# Patient Record
Sex: MALE | Race: WHITE | NOT HISPANIC OR LATINO | Employment: FULL TIME | URBAN - METROPOLITAN AREA
[De-identification: names, ages, dates, MRNs, and addresses within clinical notes are randomized per-mention and may not be internally consistent; named-entity substitution may affect disease eponyms.]

---

## 2021-12-08 ENCOUNTER — APPOINTMENT (OUTPATIENT)
Dept: GENERAL RADIOLOGY | Facility: HOSPITAL | Age: 21
End: 2021-12-08

## 2021-12-08 ENCOUNTER — HOSPITAL ENCOUNTER (EMERGENCY)
Facility: HOSPITAL | Age: 21
Discharge: HOME | End: 2021-12-08
Attending: EMERGENCY MEDICINE | Admitting: EMERGENCY MEDICINE

## 2021-12-08 VITALS
TEMPERATURE: 98.3 F | BODY MASS INDEX: 20 KG/M2 | WEIGHT: 142.86 LBS | OXYGEN SATURATION: 98 % | RESPIRATION RATE: 17 BRPM | HEART RATE: 70 BPM | SYSTOLIC BLOOD PRESSURE: 97 MMHG | DIASTOLIC BLOOD PRESSURE: 69 MMHG | HEIGHT: 71 IN

## 2021-12-08 DIAGNOSIS — S60.221A CONTUSION OF RIGHT HAND, INITIAL ENCOUNTER: Primary | ICD-10-CM

## 2021-12-08 PROCEDURE — 73130 X-RAY EXAM OF HAND: CPT

## 2021-12-08 PROCEDURE — 99282 EMERGENCY DEPT VISIT SF MDM: CPT

## 2021-12-08 RX ORDER — NAPROXEN 375 MG/1
375 TABLET ORAL 2 TIMES DAILY PRN
Qty: 14 TABLET | Refills: 0 | Status: SHIPPED | OUTPATIENT
Start: 2021-12-08

## 2021-12-09 NOTE — ED PROVIDER NOTES
Subjective   Patient is a 21-year-old male who states he injured his right hand while he was working on a car.  He complains of pain at that site.  Has no other complaint of injury          Review of Systems  For numbness tingling or other complaint of injury  No past medical history on file.    Allergies   Allergen Reactions   • [place] Hives       No past surgical history on file.    No family history on file.    Social History     Socioeconomic History   • Marital status: Single           Objective   Physical Exam  Extremities M shows pain on palpation of the dorsum of the patient's right hand.  There is no swelling ecchymosis or deformity.  Has 2+ pulses and is neurovascular tact.  Procedures           ED Course      XR Hand 3+ View Right    Result Date: 12/8/2021  1.An acute osseous abnormality is not definitely identified.  Electronically Signed By-Grey Davis MD On:12/8/2021 7:58 PM This report was finalized on [id number] by  Grey Davis MD.                                               MDM  Number of Diagnoses or Management Options  Diagnosis management comments: She has findings consistent with right hand contusion.  Will be discharged with a prescription for Naprosyn.  He is ice elevation and see his MD for recheck as needed.       Amount and/or Complexity of Data Reviewed  Tests in the radiology section of CPT®: reviewed    Risk of Complications, Morbidity, and/or Mortality  Presenting problems: moderate  Diagnostic procedures: moderate  Management options: moderate    Patient Progress  Patient progress: stable      Final diagnoses:   Contusion of right hand, initial encounter       ED Disposition  ED Disposition     ED Disposition Condition Comment    Discharge Stable           No follow-up provider specified.       Medication List      New Prescriptions    naproxen 375 MG tablet  Commonly known as: NAPROSYN  Take 1 tablet by mouth 2 (Two) Times a Day As Needed for Moderate Pain .            Where to Get Your Medications      These medications were sent to [id number] - [place], [place] - [address] [address] [address] [facility] OF [facility] & [address] [address] - [phone]  - [phone] [address] [place] [address]    Phone: [phone]   · naproxen 375 MG tablet          Dennis Jeter MD  12/08/21 2027

## 2025-03-22 ENCOUNTER — APPOINTMENT (OUTPATIENT)
Dept: CT IMAGING | Facility: HOSPITAL | Age: 25
End: 2025-03-22
Payer: MEDICAID

## 2025-03-22 ENCOUNTER — HOSPITAL ENCOUNTER (EMERGENCY)
Facility: HOSPITAL | Age: 25
Discharge: HOME | End: 2025-03-22
Attending: EMERGENCY MEDICINE
Payer: MEDICAID

## 2025-03-22 VITALS
TEMPERATURE: 98 F | SYSTOLIC BLOOD PRESSURE: 117 MMHG | WEIGHT: 141.2 LBS | RESPIRATION RATE: 18 BRPM | HEART RATE: 76 BPM | DIASTOLIC BLOOD PRESSURE: 79 MMHG | OXYGEN SATURATION: 97 % | BODY MASS INDEX: 19.77 KG/M2 | HEIGHT: 71 IN

## 2025-03-22 DIAGNOSIS — R51.9 ACUTE FACIAL PAIN: ICD-10-CM

## 2025-03-22 DIAGNOSIS — L03.213 PRESEPTAL CELLULITIS OF RIGHT EYE: ICD-10-CM

## 2025-03-22 DIAGNOSIS — L02.01 FACIAL ABSCESS: Primary | ICD-10-CM

## 2025-03-22 LAB
ANION GAP SERPL CALCULATED.3IONS-SCNC: 9.2 MMOL/L (ref 5–15)
BASOPHILS # BLD AUTO: 0.04 10*3/MM3 (ref 0–0.2)
BASOPHILS NFR BLD AUTO: 0.5 % (ref 0–1.5)
BUN SERPL-MCNC: 9 MG/DL (ref 6–20)
BUN/CREAT SERPL: 12.2 (ref 7–25)
CALCIUM SPEC-SCNC: 10.1 MG/DL (ref 8.6–10.5)
CHLORIDE SERPL-SCNC: 100 MMOL/L (ref 98–107)
CO2 SERPL-SCNC: 27.8 MMOL/L (ref 22–29)
CREAT SERPL-MCNC: 0.74 MG/DL (ref 0.76–1.27)
D-LACTATE SERPL-SCNC: 1 MMOL/L (ref 0.5–2)
DEPRECATED RDW RBC AUTO: 40.8 FL (ref 37–54)
EGFRCR SERPLBLD CKD-EPI 2021: 128.2 ML/MIN/1.73
EOSINOPHIL # BLD AUTO: 0.17 10*3/MM3 (ref 0–0.4)
EOSINOPHIL NFR BLD AUTO: 2.2 % (ref 0.3–6.2)
ERYTHROCYTE [DISTWIDTH] IN BLOOD BY AUTOMATED COUNT: 11.7 % (ref 12.3–15.4)
GLUCOSE SERPL-MCNC: 99 MG/DL (ref 65–99)
HCT VFR BLD AUTO: 47.5 % (ref 37.5–51)
HGB BLD-MCNC: 15.3 G/DL (ref 13–17.7)
IMM GRANULOCYTES # BLD AUTO: 0.02 10*3/MM3 (ref 0–0.05)
IMM GRANULOCYTES NFR BLD AUTO: 0.3 % (ref 0–0.5)
LYMPHOCYTES # BLD AUTO: 1.6 10*3/MM3 (ref 0.7–3.1)
LYMPHOCYTES NFR BLD AUTO: 20.8 % (ref 19.6–45.3)
MCH RBC QN AUTO: 29.9 PG (ref 26.6–33)
MCHC RBC AUTO-ENTMCNC: 32.2 G/DL (ref 31.5–35.7)
MCV RBC AUTO: 93 FL (ref 79–97)
MONOCYTES # BLD AUTO: 0.77 10*3/MM3 (ref 0.1–0.9)
MONOCYTES NFR BLD AUTO: 10 % (ref 5–12)
NEUTROPHILS NFR BLD AUTO: 5.09 10*3/MM3 (ref 1.7–7)
NEUTROPHILS NFR BLD AUTO: 66.2 % (ref 42.7–76)
PLATELET # BLD AUTO: 209 10*3/MM3 (ref 140–450)
PMV BLD AUTO: 10.2 FL (ref 6–12)
POTASSIUM SERPL-SCNC: 3.8 MMOL/L (ref 3.5–5.2)
RBC # BLD AUTO: 5.11 10*6/MM3 (ref 4.14–5.8)
SODIUM SERPL-SCNC: 137 MMOL/L (ref 136–145)
WBC NRBC COR # BLD AUTO: 7.69 10*3/MM3 (ref 3.4–10.8)

## 2025-03-22 PROCEDURE — 25010000002 DEXAMETHASONE PER 1 MG: Performed by: NURSE PRACTITIONER

## 2025-03-22 PROCEDURE — 80048 BASIC METABOLIC PNL TOTAL CA: CPT | Performed by: NURSE PRACTITIONER

## 2025-03-22 PROCEDURE — 70487 CT MAXILLOFACIAL W/DYE: CPT

## 2025-03-22 PROCEDURE — 87205 SMEAR GRAM STAIN: CPT | Performed by: NURSE PRACTITIONER

## 2025-03-22 PROCEDURE — 83605 ASSAY OF LACTIC ACID: CPT | Performed by: NURSE PRACTITIONER

## 2025-03-22 PROCEDURE — 87147 CULTURE TYPE IMMUNOLOGIC: CPT | Performed by: NURSE PRACTITIONER

## 2025-03-22 PROCEDURE — 85025 COMPLETE CBC W/AUTO DIFF WBC: CPT | Performed by: NURSE PRACTITIONER

## 2025-03-22 PROCEDURE — 25510000001 IOPAMIDOL PER 1 ML: Performed by: EMERGENCY MEDICINE

## 2025-03-22 PROCEDURE — 87186 SC STD MICRODIL/AGAR DIL: CPT | Performed by: NURSE PRACTITIONER

## 2025-03-22 PROCEDURE — 96374 THER/PROPH/DIAG INJ IV PUSH: CPT

## 2025-03-22 PROCEDURE — 25010000002 LIDOCAINE PF 1% 1 % SOLUTION: Performed by: NURSE PRACTITIONER

## 2025-03-22 PROCEDURE — 10061 I&D ABSCESS COMP/MULTIPLE: CPT | Performed by: NURSE PRACTITIONER

## 2025-03-22 PROCEDURE — 99283 EMERGENCY DEPT VISIT LOW MDM: CPT | Performed by: NURSE PRACTITIONER

## 2025-03-22 PROCEDURE — 87070 CULTURE OTHR SPECIMN AEROBIC: CPT | Performed by: NURSE PRACTITIONER

## 2025-03-22 PROCEDURE — 99285 EMERGENCY DEPT VISIT HI MDM: CPT

## 2025-03-22 RX ORDER — HYDROCODONE BITARTRATE AND ACETAMINOPHEN 5; 325 MG/1; MG/1
1 TABLET ORAL EVERY 8 HOURS PRN
Qty: 9 TABLET | Refills: 0 | Status: SHIPPED | OUTPATIENT
Start: 2025-03-22

## 2025-03-22 RX ORDER — LIDOCAINE HYDROCHLORIDE 10 MG/ML
10 INJECTION, SOLUTION EPIDURAL; INFILTRATION; INTRACAUDAL; PERINEURAL ONCE
Status: COMPLETED | OUTPATIENT
Start: 2025-03-22 | End: 2025-03-22

## 2025-03-22 RX ORDER — DEXAMETHASONE SODIUM PHOSPHATE 4 MG/ML
10 INJECTION, SOLUTION INTRA-ARTICULAR; INTRALESIONAL; INTRAMUSCULAR; INTRAVENOUS; SOFT TISSUE ONCE
Status: COMPLETED | OUTPATIENT
Start: 2025-03-22 | End: 2025-03-22

## 2025-03-22 RX ORDER — IOPAMIDOL 755 MG/ML
50 INJECTION, SOLUTION INTRAVASCULAR
Status: COMPLETED | OUTPATIENT
Start: 2025-03-22 | End: 2025-03-22

## 2025-03-22 RX ORDER — SULFAMETHOXAZOLE AND TRIMETHOPRIM 800; 160 MG/1; MG/1
1 TABLET ORAL 2 TIMES DAILY
Qty: 14 TABLET | Refills: 0 | Status: SHIPPED | OUTPATIENT
Start: 2025-03-22 | End: 2025-03-29

## 2025-03-22 RX ORDER — SODIUM CHLORIDE 0.9 % (FLUSH) 0.9 %
10 SYRINGE (ML) INJECTION AS NEEDED
Status: DISCONTINUED | OUTPATIENT
Start: 2025-03-22 | End: 2025-03-22 | Stop reason: HOSPADM

## 2025-03-22 RX ORDER — CEFPODOXIME PROXETIL 200 MG/1
200 TABLET, FILM COATED ORAL EVERY 12 HOURS
Qty: 14 TABLET | Refills: 0 | Status: SHIPPED | OUTPATIENT
Start: 2025-03-22 | End: 2025-03-29

## 2025-03-22 RX ADMIN — LIDOCAINE HYDROCHLORIDE 10 ML: 10 INJECTION, SOLUTION EPIDURAL; INFILTRATION; INTRACAUDAL; PERINEURAL at 18:57

## 2025-03-22 RX ADMIN — IOPAMIDOL 50 ML: 755 INJECTION, SOLUTION INTRAVENOUS at 18:12

## 2025-03-22 RX ADMIN — DEXAMETHASONE SODIUM PHOSPHATE 10 MG: 4 INJECTION, SOLUTION INTRAMUSCULAR; INTRAVENOUS at 17:43

## 2025-03-22 NOTE — FSED PROVIDER NOTE
Subjective   History of Present Illness  24 y.o. male presents with 4-day history of left upper eyelid cellulitis after tweezing an infected eyebrow hair. Denies fever sweats chills. He reports some blurred vision with occasional eye pain.     History provided by:  Patient      Review of Systems    No past medical history on file.    Allergies   Allergen Reactions    [place] Hives       No past surgical history on file.    No family history on file.    Social History     Socioeconomic History    Marital status: Single     Visual acuity:    OS 20/20    OD 2020    OU 2020    No correction.    Objective   Physical Exam  Vitals and nursing note reviewed.   Constitutional:       General: He is awake. He is not in acute distress.     Appearance: Normal appearance. He is well-developed and normal weight.   HENT:      Head: Normocephalic and atraumatic. No right periorbital erythema or left periorbital erythema.      Right Ear: Hearing, tympanic membrane, ear canal and external ear normal. No middle ear effusion. Tympanic membrane is not injected, scarred, perforated, erythematous, retracted or bulging.      Left Ear: Hearing, tympanic membrane, ear canal and external ear normal.  No middle ear effusion. Tympanic membrane is not injected, scarred, perforated, erythematous, retracted or bulging.      Nose: Nose normal. No mucosal edema or rhinorrhea.      Right Sinus: No maxillary sinus tenderness or frontal sinus tenderness.      Left Sinus: No maxillary sinus tenderness or frontal sinus tenderness.      Mouth/Throat:      Lips: Pink. No lesions.      Mouth: Mucous membranes are moist. No angioedema.      Dentition: Normal dentition. No dental caries or dental abscesses.      Pharynx: Oropharynx is clear. Uvula midline. No oropharyngeal exudate or uvula swelling.      Tonsils: 2+ on the right. 2+ on the left.   Eyes:      General: Gaze aligned appropriately.      Extraocular Movements: Extraocular movements intact.       Conjunctiva/sclera: Conjunctivae normal.      Pupils: Pupils are equal, round, and reactive to light.     Cardiovascular:      Rate and Rhythm: Normal rate and regular rhythm.      Heart sounds: Normal heart sounds, S1 normal and S2 normal. Heart sounds not distant. No murmur heard.     No friction rub. No gallop.   Pulmonary:      Effort: Pulmonary effort is normal. No respiratory distress.      Breath sounds: Normal breath sounds and air entry. No stridor. No wheezing or rales.   Musculoskeletal:      Cervical back: Normal range of motion and neck supple.      Right lower leg: No edema.      Left lower leg: No edema.   Lymphadenopathy:      Cervical: No cervical adenopathy.   Skin:     General: Skin is warm and dry.      Capillary Refill: Capillary refill takes less than 2 seconds.      Coloration: Skin is not pale.      Findings: No rash.   Neurological:      Mental Status: He is alert and oriented to person, place, and time.      GCS: GCS eye subscore is 4. GCS verbal subscore is 5. GCS motor subscore is 6.      Cranial Nerves: No cranial nerve deficit.      Sensory: No sensory deficit.      Motor: No abnormal muscle tone.   Psychiatric:         Behavior: Behavior is cooperative.         Incision & Drainage    Date/Time: 3/22/2025 6:50 PM    Performed by: Teresa Nash APRN  Authorized by: Ruy Flores MD    Consent:     Consent obtained:  Verbal    Consent given by:  Patient    Risks, benefits, and alternatives were discussed: yes      Risks discussed:  Incomplete drainage and pain  Universal protocol:     Procedure explained and questions answered to patient or proxy's satisfaction: yes      Patient identity confirmed:  Verbally with patient and hospital-assigned identification number  Location:     Type:  Abscess    Size:  2 x 2    Location: right eyebrow.  Pre-procedure details:     Skin preparation:  Chlorhexidine  Anesthesia:     Anesthesia method:  Local infiltration    Local anesthetic:  Lidocaine  "1% w/o epi  Procedure type:     Complexity:  Complex  Procedure details:     Incision types:  Single straight    Incision depth:  Dermal    Wound management:  Probed and deloculated    Drainage:  Purulent    Drainage amount:  Copious    Packing materials:  1/4 in iodoform gauze    Amount 1/4\" iodoform:  1.5\"  Post-procedure details:     Procedure completion:  Tolerated well, no immediate complications             ED Course                                           Medical Decision Making  Problems Addressed:  Acute facial pain: complicated acute illness or injury  Facial abscess: complicated acute illness or injury  Preseptal cellulitis of right eye: complicated acute illness or injury    Amount and/or Complexity of Data Reviewed  Labs: ordered.  Radiology: ordered.    Risk  Prescription drug management.    Interpreted by radiologist as below:     CT Facial Bones With Contrast  Result Date: 3/22/2025  Impression: 1.Right facial cellulitis with a small abscess within the right anterolateral facial soft tissue, as described above. 2.No suspicious intraorbital extension. No CT evidence of osteomyelitis. Electronically Signed: Dat Gallagher DO  3/22/2025 6:27 PM EDT  Workstation ID: [id number]        /79   Pulse 76   Temp 98 °F (36.7 °C) (Oral)   Resp 18   Ht 180.3 cm (71\")   Wt 64 kg (141 lb 3.2 oz)   SpO2 97%   BMI 19.69 kg/m²      Lab Results (last 24 hours)       Procedure Component Value Units Date/Time    Lactic Acid, Plasma [[id number]]  (Normal) Collected: 03/22/25 1742    Specimen: Blood Updated: 03/22/25 1808     Lactate 1.0 mmol/L     CBC & Differential [[id number]]  (Abnormal) Collected: 03/22/25 1742    Specimen: Blood Updated: 03/22/25 1751    Narrative:      The following orders were created for panel order CBC & Differential.  Procedure                               Abnormality         Status                     ---------                               -----------         ------              "        CBC Auto Differential[[id number]]        Abnormal            Final result                 Please view results for these tests on the individual orders.    Basic Metabolic Panel [[id number]]  (Abnormal) Collected: 03/22/25 1742    Specimen: Blood Updated: 03/22/25 1809     Glucose 99 mg/dL      BUN 9 mg/dL      Creatinine 0.74 mg/dL      Sodium 137 mmol/L      Potassium 3.8 mmol/L      Chloride 100 mmol/L      CO2 27.8 mmol/L      Calcium 10.1 mg/dL      BUN/Creatinine Ratio 12.2     Anion Gap 9.2 mmol/L      eGFR 128.2 mL/min/1.73     Narrative:      GFR Categories in Chronic Kidney Disease (CKD)      GFR Category          GFR (mL/min/1.73)    Interpretation  G1                     90 or greater         Normal or high (1)  G2                      60-89                Mild decrease (1)  G3a                   45-59                Mild to moderate decrease  G3b                   30-44                Moderate to severe decrease  G4                    15-29                Severe decrease  G5                    14 or less           Kidney failure          (1)In the absence of evidence of kidney disease, neither GFR category G1 or G2 fulfill the criteria for CKD.    eGFR calculation 2021 CKD-EPI creatinine equation, which does not include race as a factor    CBC Auto Differential [[id number]]  (Abnormal) Collected: 03/22/25 1742    Specimen: Blood Updated: 03/22/25 1751     WBC 7.69 10*3/mm3      RBC 5.11 10*6/mm3      Hemoglobin 15.3 g/dL      Hematocrit 47.5 %      MCV 93.0 fL      MCH 29.9 pg      MCHC 32.2 g/dL      RDW 11.7 %      RDW-SD 40.8 fl      MPV 10.2 fL      Platelets 209 10*3/mm3      Neutrophil % 66.2 %      Lymphocyte % 20.8 %      Monocyte % 10.0 %      Eosinophil % 2.2 %      Basophil % 0.5 %      Immature Grans % 0.3 %      Neutrophils, Absolute 5.09 10*3/mm3      Lymphocytes, Absolute 1.60 10*3/mm3      Monocytes, Absolute 0.77 10*3/mm3      Eosinophils, Absolute 0.17 10*3/mm3      Basophils,  Absolute 0.04 10*3/mm3      Immature Grans, Absolute 0.02 10*3/mm3     Wound Culture - Swab, Face [[id number]] Collected: 03/22/25 1856    Specimen: Swab from Face Updated: 03/22/25 1859             Medications   sodium chloride 0.9 % flush 10 mL (has no administration in time range)   dexAMETHasone (DECADRON) injection 10 mg (10 mg Intravenous Given 3/22/25 1743)   iopamidol (ISOVUE-370) 76 % injection 50 mL (50 mL Intravenous Given 3/22/25 1812)   lidocaine PF 1% (XYLOCAINE) injection 10 mL (10 mL Infiltration Given 3/22/25 1857)        Appropriate PPE worn during patient exam.  Appropriate monitoring initiated. Patient is alert and oriented x3.  No acute distress noted. Regular rate and rhythm with S1/S2. Lungs are clear to auscultation bilaterally.  Patient has a palpable abscess noted on the lateral aspect of the eyebrow.  There is significant preseptal cellulitis on both upper and lower eyelid.  IV established and labs obtained.  Patient was given Decadron 10 mg IV.  CT of the facial bones with IV contrast obtained with the above findings.  CBC BMP unremarkable.  Lactic 1.0.  Wound culture pending.  Patient was treated empirically with cefpodoxime and Bactrim.    I reviewed chart 12/8/2021 patient was seen in the ER for head contusion. My radiology interpretation of CT facial bones with contrast shows drainable abscess, no septal cellulitis. Differential Diagnoses, not all-inclusive and does not constitute entirety of all causes: Preseptal cellulitis, septal cellulitis, facial abscess.  Preseptal cellulitis and facial abscess determined by exam and imaging.  Septal cellulitis ruled out by EOMs intact, and imaging.    Disposition/Treatment: Discussed results with patient, verbalized understanding. Discussed reasons to return, patient verbalized understanding. Agreeable with plan of care. Patient was stable upon disposition.    Upon reassessment, patient is flesh tone warm and dry no acute distress noted.  Vital  signs are stable. Discussed return precautions, patient verbalized understanding.     Part of this note may be an electronic transcription/translation of spoken language to printed text using the Dragon Dictation System.   Final diagnoses:   Facial abscess   Preseptal cellulitis of right eye   Acute facial pain       ED Disposition  ED Disposition       ED Disposition   Discharge    Condition   Stable    Comment   --               PATIENT CONNECTION - [facility] [address] [phone]  Schedule an appointment as soon as possible for a visit   To Establish Primary Care    [address] [address] [address] [place] [address] [phone]  Go to   If symptoms worsen         Medication List        New Prescriptions      cefpodoxime 200 MG tablet  Commonly known as: VANTIN  Take 1 tablet by mouth Every 12 (Twelve) Hours for 7 days.     HYDROcodone-acetaminophen 5-325 MG per tablet  Commonly known as: NORCO  Take 1 tablet by mouth Every 8 (Eight) Hours As Needed for Severe Pain.     sulfamethoxazole-trimethoprim 800-160 MG per tablet  Commonly known as: BACTRIM DS,SEPTRA DS  Take 1 tablet by mouth 2 (Two) Times a Day for 7 days.               Where to Get Your Medications        These medications were sent to [facility]/pharmacy #[id number] - [place], [place] - [address] - [phone] [facility] [phone] FX [address] [facility] [place] [address]      Phone: [phone]   cefpodoxime 200 MG tablet  HYDROcodone-acetaminophen 5-325 MG per tablet  sulfamethoxazole-trimethoprim 800-160 MG per tablet

## 2025-03-22 NOTE — DISCHARGE INSTRUCTIONS
Take pain medications as directed.  Do not drive while taking this medication.  Make sure that you are drinking at least 72 ounces of water daily.  Take stool softeners as needed.     Cleanse twice daily with antibacterial soap and water. Apply warm compresses to the affected area 3-4 times daily for the next 3 days. Remove packing in 48 hours.  Do not push, squeeze, nor pick on that area. [facility] and a provider will call with those results. Follow up with your PCP for further management. Go to the ER for new or worsening symptoms.

## 2025-03-22 NOTE — ED NOTES
At bedside assisting provider with draining abscess , pt tolerated well. Wound care provided. Wound care education with pt , pt verbalized understanding and f/u

## 2025-03-25 LAB
BACTERIA SPEC AEROBE CULT: ABNORMAL
GRAM STN SPEC: ABNORMAL
GRAM STN SPEC: ABNORMAL

## 2025-03-25 RX ORDER — DOXYCYCLINE 100 MG/1
100 CAPSULE ORAL 2 TIMES DAILY
Qty: 20 CAPSULE | Refills: 0 | Status: SHIPPED | OUTPATIENT
Start: 2025-03-25 | End: 2025-04-04

## 2025-05-29 ENCOUNTER — HOSPITAL ENCOUNTER (EMERGENCY)
Facility: HOSPITAL | Age: 25
Discharge: HOME | End: 2025-05-30
Attending: EMERGENCY MEDICINE
Payer: MEDICAID

## 2025-05-29 DIAGNOSIS — L03.116 CELLULITIS OF LEFT LOWER EXTREMITY: Primary | ICD-10-CM

## 2025-05-29 PROCEDURE — 36415 COLL VENOUS BLD VENIPUNCTURE: CPT

## 2025-05-29 PROCEDURE — 99283 EMERGENCY DEPT VISIT LOW MDM: CPT

## 2025-05-29 RX ORDER — VANCOMYCIN/0.9 % SOD CHLORIDE 1.5G/250ML
1500 PLASTIC BAG, INJECTION (ML) INTRAVENOUS ONCE
Status: COMPLETED | OUTPATIENT
Start: 2025-05-30 | End: 2025-05-30

## 2025-05-29 RX ORDER — SODIUM CHLORIDE 0.9 % (FLUSH) 0.9 %
10 SYRINGE (ML) INJECTION AS NEEDED
Status: DISCONTINUED | OUTPATIENT
Start: 2025-05-29 | End: 2025-05-30 | Stop reason: HOSPADM

## 2025-05-29 NOTE — Clinical Note
[facility] EMERGENCY DEPARTMENT  [address] [place] [address]  Phone: [phone]    Matt Doe was seen and treated in our emergency department on 5/29/2025.  He may return to work on 06/02/2025.         Thank you for choosing [facility].    Jagdish Pierre MD

## 2025-05-29 NOTE — Clinical Note
[facility] EMERGENCY DEPARTMENT  [address] [place] [address]  Phone: [phone]    Matt Doe was seen and treated in our emergency department on 5/29/2025.  He may return to work on 06/02/2025.         Thank you for choosing [facility].    Jagdish Pierre MD

## 2025-05-30 VITALS
HEIGHT: 71 IN | WEIGHT: 148 LBS | SYSTOLIC BLOOD PRESSURE: 122 MMHG | OXYGEN SATURATION: 99 % | RESPIRATION RATE: 16 BRPM | DIASTOLIC BLOOD PRESSURE: 82 MMHG | HEART RATE: 78 BPM | BODY MASS INDEX: 20.72 KG/M2 | TEMPERATURE: 100.5 F

## 2025-05-30 LAB
ALBUMIN SERPL-MCNC: 4.7 G/DL (ref 3.5–5.2)
ALBUMIN/GLOB SERPL: 1.8 G/DL
ALP SERPL-CCNC: 72 U/L (ref 39–117)
ALT SERPL W P-5'-P-CCNC: 59 U/L (ref 1–41)
ANION GAP SERPL CALCULATED.3IONS-SCNC: 11.3 MMOL/L (ref 5–15)
AST SERPL-CCNC: 32 U/L (ref 1–40)
BASOPHILS # BLD AUTO: 0.05 10*3/MM3 (ref 0–0.2)
BASOPHILS NFR BLD AUTO: 0.6 % (ref 0–1.5)
BILIRUB SERPL-MCNC: 0.7 MG/DL (ref 0–1.2)
BUN SERPL-MCNC: 9.3 MG/DL (ref 6–20)
BUN/CREAT SERPL: 12.9 (ref 7–25)
CALCIUM SPEC-SCNC: 9.6 MG/DL (ref 8.6–10.5)
CHLORIDE SERPL-SCNC: 102 MMOL/L (ref 98–107)
CO2 SERPL-SCNC: 23.7 MMOL/L (ref 22–29)
CREAT SERPL-MCNC: 0.72 MG/DL (ref 0.76–1.27)
D-LACTATE SERPL-SCNC: 0.6 MMOL/L (ref 0.3–2)
DEPRECATED RDW RBC AUTO: 39.6 FL (ref 37–54)
EGFRCR SERPLBLD CKD-EPI 2021: 129.2 ML/MIN/1.73
EOSINOPHIL # BLD AUTO: 0.18 10*3/MM3 (ref 0–0.4)
EOSINOPHIL NFR BLD AUTO: 2.1 % (ref 0.3–6.2)
ERYTHROCYTE [DISTWIDTH] IN BLOOD BY AUTOMATED COUNT: 11.7 % (ref 12.3–15.4)
GLOBULIN UR ELPH-MCNC: 2.6 GM/DL
GLUCOSE SERPL-MCNC: 106 MG/DL (ref 65–99)
HCT VFR BLD AUTO: 43.3 % (ref 37.5–51)
HGB BLD-MCNC: 14.4 G/DL (ref 13–17.7)
HOLD SPECIMEN: NORMAL
HOLD SPECIMEN: NORMAL
IMM GRANULOCYTES # BLD AUTO: 0.03 10*3/MM3 (ref 0–0.05)
IMM GRANULOCYTES NFR BLD AUTO: 0.4 % (ref 0–0.5)
LYMPHOCYTES # BLD AUTO: 1.45 10*3/MM3 (ref 0.7–3.1)
LYMPHOCYTES NFR BLD AUTO: 17.1 % (ref 19.6–45.3)
MCH RBC QN AUTO: 30.3 PG (ref 26.6–33)
MCHC RBC AUTO-ENTMCNC: 33.3 G/DL (ref 31.5–35.7)
MCV RBC AUTO: 91.2 FL (ref 79–97)
MONOCYTES # BLD AUTO: 1.1 10*3/MM3 (ref 0.1–0.9)
MONOCYTES NFR BLD AUTO: 13 % (ref 5–12)
NEUTROPHILS NFR BLD AUTO: 5.66 10*3/MM3 (ref 1.7–7)
NEUTROPHILS NFR BLD AUTO: 66.8 % (ref 42.7–76)
NRBC BLD AUTO-RTO: 0 /100 WBC (ref 0–0.2)
PLATELET # BLD AUTO: 170 10*3/MM3 (ref 140–450)
PMV BLD AUTO: 10.1 FL (ref 6–12)
POTASSIUM SERPL-SCNC: 3.5 MMOL/L (ref 3.5–5.2)
PROT SERPL-MCNC: 7.3 G/DL (ref 6–8.5)
RBC # BLD AUTO: 4.75 10*6/MM3 (ref 4.14–5.8)
SODIUM SERPL-SCNC: 137 MMOL/L (ref 136–145)
WBC NRBC COR # BLD AUTO: 8.47 10*3/MM3 (ref 3.4–10.8)
WHOLE BLOOD HOLD COAG: NORMAL
WHOLE BLOOD HOLD SPECIMEN: NORMAL

## 2025-05-30 PROCEDURE — 87070 CULTURE OTHR SPECIMN AEROBIC: CPT | Performed by: EMERGENCY MEDICINE

## 2025-05-30 PROCEDURE — 87205 SMEAR GRAM STAIN: CPT | Performed by: EMERGENCY MEDICINE

## 2025-05-30 PROCEDURE — 96365 THER/PROPH/DIAG IV INF INIT: CPT

## 2025-05-30 PROCEDURE — 96368 THER/DIAG CONCURRENT INF: CPT

## 2025-05-30 PROCEDURE — 87040 BLOOD CULTURE FOR BACTERIA: CPT | Performed by: EMERGENCY MEDICINE

## 2025-05-30 PROCEDURE — 80053 COMPREHEN METABOLIC PANEL: CPT | Performed by: EMERGENCY MEDICINE

## 2025-05-30 PROCEDURE — 25010000002 CEFTRIAXONE PER 250 MG: Performed by: EMERGENCY MEDICINE

## 2025-05-30 PROCEDURE — 85025 COMPLETE CBC W/AUTO DIFF WBC: CPT | Performed by: EMERGENCY MEDICINE

## 2025-05-30 PROCEDURE — 87147 CULTURE TYPE IMMUNOLOGIC: CPT | Performed by: EMERGENCY MEDICINE

## 2025-05-30 PROCEDURE — 83605 ASSAY OF LACTIC ACID: CPT | Performed by: EMERGENCY MEDICINE

## 2025-05-30 PROCEDURE — 87186 SC STD MICRODIL/AGAR DIL: CPT | Performed by: EMERGENCY MEDICINE

## 2025-05-30 PROCEDURE — 25010000002 VANCOMYCIN 1.5-0.9 GM/500ML-% SOLUTION: Performed by: EMERGENCY MEDICINE

## 2025-05-30 RX ORDER — CEPHALEXIN 500 MG/1
500 CAPSULE ORAL 4 TIMES DAILY
Qty: 40 CAPSULE | Refills: 0 | Status: SHIPPED | OUTPATIENT
Start: 2025-05-30

## 2025-05-30 RX ORDER — CLINDAMYCIN HYDROCHLORIDE 300 MG/1
300 CAPSULE ORAL 4 TIMES DAILY
Qty: 40 CAPSULE | Refills: 0 | Status: SHIPPED | OUTPATIENT
Start: 2025-05-30

## 2025-05-30 RX ADMIN — Medication 1500 MG: at 01:37

## 2025-05-30 RX ADMIN — CEFTRIAXONE SODIUM 1000 MG: 1 INJECTION, POWDER, FOR SOLUTION INTRAMUSCULAR; INTRAVENOUS at 01:13

## 2025-05-30 NOTE — ED PROVIDER NOTES
Subjective   History of Present Illness  24-year-old male with suspected ingrown hair to his left medial leg 4 days ago developed fevers this evening with increasing redness.  Patient denies any tick or insect bites.  Patient had a facial cellulitis in March that tested positive for MRSA that was resistant to the Bactrim.  This was sensitive to tetracycline and patient was managed on doxycycline with resolution.      Review of Systems   Skin:         Left lower extremity cellulitis       No past medical history on file.    Allergies   Allergen Reactions    [place] Hives       No past surgical history on file.    No family history on file.    Social History     Socioeconomic History    Marital status: Single           Objective   Physical Exam  Constitutional:       General: He is not in acute distress.     Appearance: Normal appearance.   HENT:      Head: Normocephalic and atraumatic.   Musculoskeletal:      Comments: There is a plaque of mildly indurated erythema on the left medial leg approximately 3 cm in diameter with a small crusted central wound, no drainage, no fluctuance to suggest abscess, the remainder of the leg is mildly edematous and warm and erythematous.  There is no evidence of lymphangitis, no clear involvement above the knee.  Distally neurovascularly intact.   Skin:     Capillary Refill: Capillary refill takes less than 2 seconds.   Neurological:      Mental Status: He is alert.   Psychiatric:         Mood and Affect: Mood normal.         Behavior: Behavior normal.         Procedures           ED Course                                                       Medical Decision Making  Patient well, vital signs stable, left lower extremity cellulitis, return precautions reviewed    Problems Addressed:  Cellulitis of left lower extremity: complicated acute illness or injury    Amount and/or Complexity of Data Reviewed  External Data Reviewed: labs.     Details: ntains abnormal data Wound Culture -  Swab, Face  Order: [id number]   Status: Final result       Next appt: None    Test Result Released: Yes (not seen)    Specimen Information: Face; Swab  2 Result Notes       View Follow-Up Encounter  Wound Culture     Lab  Scant growth (1+) Staphylococcus aureus, MRSA Abnormal   [facility]    Methicillin resistant Staphylococcus aureus, Patient may be an isolation risk.         Gram Stain    Lab  Moderate (3+) WBCs per low power field  [facility]  Rare (1+) Gram positive cocci in clusters  [facility]        Susceptibility       Staphylococcus aureus, MRSA    [facility]    Clindamycin 0.25 ug/ml Susceptible    Erythromycin >=8 ug/ml Resistant    Oxacillin >=4 ug/ml Resistant    Rifampin <=0.5 ug/ml Susceptible    Tetracycline <=1 ug/ml Susceptible    Trimethoprim + Sulfamethoxazole >=320 ug/ml Resistant    Vancomycin <=0.5 ug/ml Susceptible         Wound culture from March 2025         Labs: ordered.     Details: Normal lactic acid    Risk  Prescription drug management.        Final diagnoses:   Cellulitis of left lower extremity       ED Disposition  ED Disposition       ED Disposition   Discharge    Condition   Stable    Comment   --               PATIENT CONNECTION - [place] [address]  [phone]  Schedule an appointment as soon as possible for a visit            Medication List        New Prescriptions      cephalexin 500 MG capsule  Commonly known as: KEFLEX  Take 1 capsule by mouth 4 (Four) Times a Day.     clindamycin 300 MG capsule  Commonly known as: CLEOCIN  Take 1 capsule by mouth 4 (Four) Times a Day.               Where to Get Your Medications        These medications were sent to [facility]/pharmacy #[id number] - [place], [place] - [address] [place] [address] - [phone] [facility] [phone] [address] [place] [address]      Phone: [phone]   cephalexin 500 MG capsule  clindamycin 300 MG capsule            Jagdish Pierre MD  05/30/25 2536

## 2025-05-31 ENCOUNTER — HOSPITAL ENCOUNTER (EMERGENCY)
Facility: HOSPITAL | Age: 25
Discharge: HOME | End: 2025-05-31
Attending: EMERGENCY MEDICINE

## 2025-05-31 VITALS
BODY MASS INDEX: 20.8 KG/M2 | OXYGEN SATURATION: 100 % | WEIGHT: 148.59 LBS | HEART RATE: 74 BPM | SYSTOLIC BLOOD PRESSURE: 124 MMHG | HEIGHT: 71 IN | RESPIRATION RATE: 16 BRPM | DIASTOLIC BLOOD PRESSURE: 73 MMHG | TEMPERATURE: 98.6 F

## 2025-05-31 DIAGNOSIS — L03.116 CELLULITIS AND ABSCESS OF LEFT LEG: Primary | ICD-10-CM

## 2025-05-31 DIAGNOSIS — L02.416 CELLULITIS AND ABSCESS OF LEFT LEG: Primary | ICD-10-CM

## 2025-05-31 LAB
ALBUMIN SERPL-MCNC: 4.4 G/DL (ref 3.5–5.2)
ALBUMIN/GLOB SERPL: 1.6 G/DL
ALP SERPL-CCNC: 83 U/L (ref 39–117)
ALT SERPL W P-5'-P-CCNC: 58 U/L (ref 1–41)
ANION GAP SERPL CALCULATED.3IONS-SCNC: ABNORMAL MMOL/L
AST SERPL-CCNC: 43 U/L (ref 1–40)
BASOPHILS # BLD AUTO: 0.03 10*3/MM3 (ref 0–0.2)
BASOPHILS NFR BLD AUTO: 0.5 % (ref 0–1.5)
BILIRUB SERPL-MCNC: 0.6 MG/DL (ref 0–1.2)
BUN BLDA-MCNC: ABNORMAL MG/DL
BUN SERPL-MCNC: 12.1 MG/DL (ref 6–20)
BUN/CREAT SERPL: 15.7 (ref 7–25)
CA-I BLDA-SCNC: ABNORMAL MMOL/L
CALCIUM SPEC-SCNC: 9.5 MG/DL (ref 8.6–10.5)
CHLORIDE BLDA-SCNC: 101 MMOL/L (ref 98–109)
CHLORIDE SERPL-SCNC: ABNORMAL MMOL/L
CO2 BLDA-SCNC: ABNORMAL MMOL/L
CO2 SERPL-SCNC: 27.3 MMOL/L (ref 22–29)
CREAT BLDA-MCNC: ABNORMAL MG/DL
CREAT SERPL-MCNC: 0.77 MG/DL (ref 0.76–1.27)
D-LACTATE SERPL-SCNC: 1 MMOL/L (ref 0.5–2)
DEPRECATED RDW RBC AUTO: 41.1 FL (ref 37–54)
EGFRCR SERPLBLD CKD-EPI 2021: 126.6 ML/MIN/1.73
EOSINOPHIL # BLD AUTO: 0.28 10*3/MM3 (ref 0–0.4)
EOSINOPHIL NFR BLD AUTO: 4.5 % (ref 0.3–6.2)
ERYTHROCYTE [DISTWIDTH] IN BLOOD BY AUTOMATED COUNT: 11.7 % (ref 12.3–15.4)
GLOBULIN UR ELPH-MCNC: 2.8 GM/DL
GLUCOSE BLDC GLUCOMTR-MCNC: ABNORMAL MG/DL
GLUCOSE SERPL-MCNC: 70 MG/DL (ref 65–99)
HCT VFR BLD AUTO: 43.9 % (ref 37.5–51)
HGB BLD-MCNC: 14.2 G/DL (ref 13–17.7)
IMM GRANULOCYTES # BLD AUTO: 0.01 10*3/MM3 (ref 0–0.05)
IMM GRANULOCYTES NFR BLD AUTO: 0.2 % (ref 0–0.5)
LYMPHOCYTES # BLD AUTO: 1.37 10*3/MM3 (ref 0.7–3.1)
LYMPHOCYTES NFR BLD AUTO: 21.9 % (ref 19.6–45.3)
MCH RBC QN AUTO: 30.3 PG (ref 26.6–33)
MCHC RBC AUTO-ENTMCNC: 32.3 G/DL (ref 31.5–35.7)
MCV RBC AUTO: 93.6 FL (ref 79–97)
MONOCYTES # BLD AUTO: 0.69 10*3/MM3 (ref 0.1–0.9)
MONOCYTES NFR BLD AUTO: 11 % (ref 5–12)
NEUTROPHILS NFR BLD AUTO: 3.87 10*3/MM3 (ref 1.7–7)
NEUTROPHILS NFR BLD AUTO: 61.9 % (ref 42.7–76)
PLATELET # BLD AUTO: 154 10*3/MM3 (ref 140–450)
PMV BLD AUTO: 10.2 FL (ref 6–12)
POTASSIUM BLDA-SCNC: 3.3 MMOL/L (ref 3.5–4.5)
POTASSIUM SERPL-SCNC: ABNORMAL MMOL/L
PROT SERPL-MCNC: 7.2 G/DL (ref 6–8.5)
RBC # BLD AUTO: 4.69 10*6/MM3 (ref 4.14–5.8)
SODIUM BLD-SCNC: 142 MMOL/L (ref 138–146)
SODIUM SERPL-SCNC: ABNORMAL MMOL/L
WBC NRBC COR # BLD AUTO: 6.25 10*3/MM3 (ref 3.4–10.8)

## 2025-05-31 PROCEDURE — 25810000003 SODIUM CHLORIDE 0.9 % SOLUTION: Performed by: EMERGENCY MEDICINE

## 2025-05-31 PROCEDURE — 99283 EMERGENCY DEPT VISIT LOW MDM: CPT

## 2025-05-31 PROCEDURE — 25010000002 CLINDAMYCIN PER 300 MG: Performed by: EMERGENCY MEDICINE

## 2025-05-31 PROCEDURE — 87040 BLOOD CULTURE FOR BACTERIA: CPT | Performed by: EMERGENCY MEDICINE

## 2025-05-31 PROCEDURE — 99282 EMERGENCY DEPT VISIT SF MDM: CPT | Performed by: EMERGENCY MEDICINE

## 2025-05-31 PROCEDURE — 80047 BASIC METABLC PNL IONIZED CA: CPT

## 2025-05-31 PROCEDURE — 10060 I&D ABSCESS SIMPLE/SINGLE: CPT | Performed by: EMERGENCY MEDICINE

## 2025-05-31 PROCEDURE — 85025 COMPLETE CBC W/AUTO DIFF WBC: CPT | Performed by: EMERGENCY MEDICINE

## 2025-05-31 PROCEDURE — 36415 COLL VENOUS BLD VENIPUNCTURE: CPT

## 2025-05-31 PROCEDURE — 80053 COMPREHEN METABOLIC PANEL: CPT | Performed by: EMERGENCY MEDICINE

## 2025-05-31 PROCEDURE — 83605 ASSAY OF LACTIC ACID: CPT | Performed by: EMERGENCY MEDICINE

## 2025-05-31 PROCEDURE — 25010000002 BUPIVACAINE (PF) 0.5 % SOLUTION: Performed by: EMERGENCY MEDICINE

## 2025-05-31 PROCEDURE — 96365 THER/PROPH/DIAG IV INF INIT: CPT

## 2025-05-31 RX ORDER — CLINDAMYCIN PHOSPHATE 600 MG/50ML
600 INJECTION, SOLUTION INTRAVENOUS ONCE
Status: COMPLETED | OUTPATIENT
Start: 2025-05-31 | End: 2025-05-31

## 2025-05-31 RX ORDER — SODIUM CHLORIDE 0.9 % (FLUSH) 0.9 %
10 SYRINGE (ML) INJECTION AS NEEDED
Status: DISCONTINUED | OUTPATIENT
Start: 2025-05-31 | End: 2025-06-01 | Stop reason: HOSPADM

## 2025-05-31 RX ORDER — BUPIVACAINE HYDROCHLORIDE 5 MG/ML
10 INJECTION, SOLUTION EPIDURAL; INTRACAUDAL; PERINEURAL ONCE
Status: COMPLETED | OUTPATIENT
Start: 2025-05-31 | End: 2025-05-31

## 2025-05-31 RX ADMIN — SODIUM CHLORIDE 1000 ML: 9 INJECTION, SOLUTION INTRAVENOUS at 22:12

## 2025-05-31 RX ADMIN — CLINDAMYCIN PHOSPHATE 600 MG: 600 INJECTION, SOLUTION INTRAVENOUS at 22:13

## 2025-05-31 RX ADMIN — BUPIVACAINE HYDROCHLORIDE 10 ML: 5 INJECTION, SOLUTION EPIDURAL; INTRACAUDAL; PERINEURAL at 23:15

## 2025-06-01 LAB
BACTERIA SPEC AEROBE CULT: ABNORMAL
GRAM STN SPEC: ABNORMAL
GRAM STN SPEC: ABNORMAL

## 2025-06-01 NOTE — [facility] PROVIDER NOTE
Subjective   History of Present Illness    Patient 24-year-old man who presents with persistent and worsening redness and swelling to the left lower leg.  Patient states symptoms began approximately 6 days prior to arrival.  He was seen 2 days ago at [facility] and a wound culture was obtained patient was started on cephalexin and clindamycin.  Unfortunately no abscess was present at that time, therefore an incision and drainage was not performed.  He states today he has noticed now with swelling with purulent drainage from an abscess.  He did have fevers when he was seen 2 days ago.  No fevers at this time.  No nausea or vomiting.    Review of Systems    History reviewed. No pertinent past medical history.    Allergies   Allergen Reactions    [place] Hives       History reviewed. No pertinent surgical history.    History reviewed. No pertinent family history.    Social History     Socioeconomic History    Marital status: Single           Objective   Physical Exam  Vitals and nursing note reviewed.   Constitutional:       General: He is not in acute distress.     Appearance: Normal appearance. He is not ill-appearing or toxic-appearing.   HENT:      Head: Normocephalic and atraumatic.      Mouth/Throat:      Mouth: Mucous membranes are moist.   Eyes:      Extraocular Movements: Extraocular movements intact.   Cardiovascular:      Rate and Rhythm: Normal rate and regular rhythm.      Pulses: Normal pulses.   Pulmonary:      Effort: Pulmonary effort is normal.   Musculoskeletal:         General: Tenderness present.      Cervical back: Normal range of motion and neck supple.      Comments: Midshaft of the left lower leg the medial side with a 1 cm raised abscess with purulent drainage.  There is surrounding erythema.  Patient is neurovascularly intact in the left foot with 2+ pedal pulse present.  No joint pain to the knee or ankle.   Skin:     General: Skin is warm and dry.      Capillary Refill: Capillary  "refill takes less than 2 seconds.   Neurological:      General: No focal deficit present.      Mental Status: He is alert.         Incision & Drainage    Date/Time: 5/31/2025 11:32 PM    Performed by: Curtis Edmond MD  Authorized by: Curtis Edmond MD    Consent:     Consent obtained:  Verbal    Consent given by:  Patient    Risks, benefits, and alternatives were discussed: yes      Risks discussed:  Pain and incomplete drainage    Alternatives discussed:  No treatment  Universal protocol:     Procedure explained and questions answered to patient or proxy's satisfaction: yes      Patient identity confirmed:  Verbally with patient  Location:     Type:  Abscess    Size:  1 cm    Location: Left lower leg medial aspect the midshaft.  Pre-procedure details:     Skin preparation:  Povidone-iodine  Sedation:     Sedation type:  None  Anesthesia:     Anesthesia method:  Local infiltration    Local anesthetic:  Bupivacaine 0.5% w/o epi  Procedure type:     Complexity:  Simple  Procedure details:     Ultrasound guidance: no      Needle aspiration: no      Incision types:  Single straight    Incision depth:  Dermal    Drainage:  Purulent    Drainage amount:  Scant    Packing materials:  1/4 in iodoform gauze    Amount 1/4\" iodoform:  3 inches  Post-procedure details:     Procedure completion:  Tolerated             ED Course                                           Medical Decision Making  Problems Addressed:  Cellulitis and abscess of left leg: complicated acute illness or injury    Amount and/or Complexity of Data Reviewed  Labs: ordered.    Risk  Prescription drug management.      Patient is a 24-year-old male with redness and tenderness to the medial aspect of the midshaft of the left lower leg.  Symptoms began 6 days ago and the patient then was seen 2 days ago when he developed a fever.  He was started on cephalexin and clindamycin.  Labs were performed which showed a lactic acid of 0.6 and white count was " normal.  Blood cultures were obtained was able to review these and no growth was noted.  A wound culture was obtained which is growing MRSA but sensitivities are pending.  Patient now presents as he has since developed a raised abscess which is draining purulent material.  He has no fevers at this time and has not had any vomiting.  Patient will have an IV established and labs obtained including blood cultures and lactic acid.  He will be given IV clindamycin.  Since sensitivity is still pending we will keep patient on clindamycin and cephalexin.  Patient has been advised he may need to change antibiotic.  Incision and drainage was performed with scant purulent material expressed.  Packing placed.  Patient will return in 3 days for packing removal or sooner if any concerns.    Final diagnoses:   Cellulitis and abscess of left leg       ED Disposition  ED Disposition       ED Disposition   Discharge    Condition   Stable    Comment   --               [place] [address] [place] [id number] [phone]  In 3 days  For wound re-check         Medication List      No changes were made to your prescriptions during this visit.

## 2025-06-01 NOTE — DISCHARGE INSTRUCTIONS
Continue taking cephalexin and clindamycin.  Please be advised you may need changes to antibiotics based on sensitivities from cultures from recent wound culture.  Return in 3 days for wound reevaluation and packing removal.  Return sooner seek immediate medical attention anytime if having worsening symptoms or any concerns.   Venipuncture to left lower forearm for ordered labs on second attempt per DEBORA Her.  Specimens taken to inpatient lab for processing.  Patient tolerated well.

## 2025-06-01 NOTE — PROGRESS NOTES
Patient wound culture resulted with MRSA. Susceptible to Clindamycin. Patient was given Rx for clindamycin. Therapy is appropriate coverage. No further follow-up required.    Microbiology Results (last 10 days)       Procedure Component Value - Date/Time    Wound Culture - Wound, Leg, Left [[id number]]  (Abnormal)  (Susceptibility) Collected: 05/30/25 0253    Lab Status: Final result Specimen: Wound from Leg, Left Updated: 06/01/25 0725     Wound Culture Scant growth (1+) Staphylococcus aureus, MRSA     Comment:   Methicillin resistant Staphylococcus aureus, Patient may be an isolation risk.        Gram Stain Few (2+) WBCs per low power field      Few (2+) Gram positive cocci in clusters    Susceptibility        Staphylococcus aureus, MRSA      ELIZABETH      Clindamycin 0.25 ug/ml Susceptible      Erythromycin <=0.25 ug/ml Susceptible      Oxacillin >=4 ug/ml Resistant      Rifampin <=0.5 ug/ml Susceptible      Tetracycline <=1 ug/ml Susceptible      Trimethoprim + Sulfamethoxazole >=320 ug/ml Resistant      Vancomycin 1 ug/ml Susceptible                           Blood Culture - Blood, Arm, Right [[id number]]  (Normal) Collected: 05/30/25 0037    Lab Status: Preliminary result Specimen: Blood from Arm, Right Updated: 06/01/25 0045     Blood Culture No growth at 2 days    Blood Culture - Blood, Arm, Left [[id number]]  (Normal) Collected: 05/30/25 0020    Lab Status: Preliminary result Specimen: Blood from Arm, Left Updated: 06/01/25 0030     Blood Culture No growth at 2 days            Jasmine Munoz RPH  6/1/2025 12:38 EDT

## 2025-06-04 LAB
BACTERIA SPEC AEROBE CULT: NORMAL
BACTERIA SPEC AEROBE CULT: NORMAL

## 2025-06-05 LAB
BACTERIA SPEC AEROBE CULT: NORMAL
BACTERIA SPEC AEROBE CULT: NORMAL

## 2025-08-03 ENCOUNTER — HOSPITAL ENCOUNTER (OUTPATIENT)
Facility: HOSPITAL | Age: 25
Discharge: HOME | End: 2025-08-03
Attending: EMERGENCY MEDICINE | Admitting: EMERGENCY MEDICINE
Payer: MEDICAID

## 2025-08-03 VITALS
DIASTOLIC BLOOD PRESSURE: 77 MMHG | HEART RATE: 76 BPM | RESPIRATION RATE: 16 BRPM | TEMPERATURE: 98.5 F | OXYGEN SATURATION: 98 % | HEIGHT: 71 IN | SYSTOLIC BLOOD PRESSURE: 119 MMHG | BODY MASS INDEX: 22.39 KG/M2 | WEIGHT: 159.9 LBS

## 2025-08-03 DIAGNOSIS — L02.01 CUTANEOUS ABSCESS OF FACE: Primary | ICD-10-CM

## 2025-08-03 PROCEDURE — G0463 HOSPITAL OUTPT CLINIC VISIT: HCPCS | Performed by: NURSE PRACTITIONER

## 2025-08-03 RX ORDER — CLINDAMYCIN HYDROCHLORIDE 300 MG/1
300 CAPSULE ORAL 4 TIMES DAILY
Qty: 40 CAPSULE | Refills: 0 | Status: SHIPPED | OUTPATIENT
Start: 2025-08-03 | End: 2025-08-13